# Patient Record
Sex: MALE | Race: WHITE | Employment: OTHER | URBAN - METROPOLITAN AREA
[De-identification: names, ages, dates, MRNs, and addresses within clinical notes are randomized per-mention and may not be internally consistent; named-entity substitution may affect disease eponyms.]

---

## 2022-08-12 NOTE — PROGRESS NOTES
[place]   Date Of Service: 8/15/2022  Provider: Hardik Mitchell DO, DO  Name: Geo Cohen   MRN: [id number]    Chief Complaint(s)      Chief Complaint   Patient presents with    New Patient     New pt multiple arthralgias         History of Present illness (HPI)    Geo Cohen   is a(n)66 y.o. male with a hx of Hypertension  referred by Santana Mahoney MD for evaluation of polyarthralgia , Rheumatoid factor elevation, elevated ESR, CRP elevation    Pain in the hips then progressed to shoulders with associated limited ROm of the shoulders treated with by PCP w/ steroid shot and taper with sig. improvement of symptoms. 1 prior episode in the hips around 15 years ago after hunting. Hand pain (1st CMC and pips) worsened recently but prior cmc joint pain years ago. He has had some relief with ibuprofen 800mg three time daily     Currently with symptoms involving shoulder, hands, neck, hips and knees. Most severe in the morning described as pain/stiffness pain up to 10/10  last Thursday. Aggravating factors: diet, inactivity   Alleviating factors:prednisone and ibuprofen, movement. Current therapy: ibuprofen 800mg three time daily prn. Walking about 2 miles daily. 35 min of AM stiffness. + swelling of mcps bilat and left knee. Difficulty getting up from seated positiong. + gelling.       -denies Photosenstivity, Rash, dry mouth/dry eyes, oral/nasal sores, Raynaud's, digital ulcerations, skin tightening, renal disease,foamy urination, hematuria, sz's, blood clots, AIHA,leukpenia/lymphopenia, thrombocytopenia, hair loss, serositis, arthritis. - denies enthesitis, dactylitis, nail changes, hx of STD,  personal or family history of Psoriatic arthritis, psoriasis, ank spond,       Cancer screening: up to date. Review of Systems    Review of Systems   Constitutional:  Positive for fatigue. Negative for fever and unexpected weight change. HENT: Negative.   Negative for congestion and mouth sores. Eyes: Negative. Negative for pain and redness. Respiratory: Negative. Negative for cough, shortness of breath and wheezing. Cardiovascular: Negative. Negative for chest pain and leg swelling. Gastrointestinal: Negative. Negative for abdominal pain, constipation, nausea and vomiting. Endocrine: Negative for polyuria. Genitourinary: Negative. Negative for difficulty urinating, frequency and hematuria. Skin: Negative. Negative for color change and rash. Neurological:  Positive for headaches (occipital). Negative for dizziness, weakness and numbness. Hematological: Negative. Negative for adenopathy. Does not bruise/bleed easily. Psychiatric/Behavioral: Negative. Negative for agitation and sleep disturbance. The patient is not nervous/anxious. PAST MEDICAL HISTORY     has no past medical history on file. PAST SURGICAL HISTORY     has no past surgical history on file. FAMILY HISTORY    No family history on file. SOCIAL HISTORY           ALLERGIES   Not on File    CURRENT MEDICATIONS    No current outpatient medications on file. PHYSICAL EXAMINATION / OBJECTIVE     Objective:  /82 (Site: Left Upper Arm, Position: Sitting, Cuff Size: Medium Adult)   Pulse 76   Ht 6' 1\" (1.854 m)   Wt 200 lb (90.7 kg)   SpO2 96%   BMI 26.39 kg/m²     Physical Exam  Vitals reviewed. Constitutional:       General: He is not in acute distress. Appearance: He is well-developed. He is not diaphoretic. HENT:      Head: Normocephalic and atraumatic. Right Ear: External ear normal.      Left Ear: External ear normal.      Mouth/Throat:      Mouth: Mucous membranes are moist.      Comments: Red papule along the soft palate  Eyes:      Conjunctiva/sclera: Conjunctivae normal.      Pupils: Pupils are equal, round, and reactive to light. Cardiovascular:      Rate and Rhythm: Normal rate and regular rhythm. Heart sounds: Normal heart sounds. Pulmonary:      Effort: Pulmonary effort is normal.      Breath sounds: Normal breath sounds. Musculoskeletal:         General: Normal range of motion. Cervical back: Neck supple. Lymphadenopathy:      Cervical: No cervical adenopathy. Skin:     General: Skin is warm and dry. Comments: Splinter hemorrhage right 4th finger   Neurological:      Mental Status: He is alert and oriented to person, place, and time. Psychiatric:         Mood and Affect: Affect normal.         Musculoskeletal:    Upper extremities:    SHOULDERS - limited internal rotation bilat. 4/5 strength bilat, tender bilateral. + miguel ángel, speed, infarspinatous ,   ELBOWS tender left lateral epicondyle. ,   WRISTS tender left, painful ROM ,   HANDS/FINGERS     MCPs tender/fullness - left 1-5 PIPs tender / boggy right 2-3. Finkelstein test bilat     Lower extremities:  HIPS tender bilat outer hips. + RITO and FADIR.   KNEES tender bilat. Patellar grind bilat. ANKLES Non-tender    FEET : Non-tender      Spine:   C-spine, T-spine & L-spine:  Non-tender , intact cericals spine ROM       KEY:  Tender :  T  Swelling: S  Non-tender : NT  No swelling: NS         LABS        CBC  No results found for: WBC, RBC, HGB, HCT, MCV, MCH, MCHC, RDW, PLT    CMP  No results found for: GLU, CALCIUM, LABALBU, PROT, NA, K, CO2, CL, BUN, CREATININE, ALKPHOS, ALT, AST    HgBA1c: No components found for: HGBA1C    No results found for: TSHFT4, TSH  No results found for: VITD25      No results found for: ANASCRN  No results found for: SSA  No results found for: SSB  No results found for: ANTI-SMITH  No results found for: DSDNAAB   No results found for: ANTIRNP  No results found for: C3, C4  No results found for: CCPAB  No results found for: RF    No components found for: CANCASCRN, APANCASCRN  No results found for: SEDRATE  No results found for: CRP    JOSEPH: Negative  ESR 25 (0-20 mm/h)  GFR: 76  TSH: 1.85 (0.27-4.2),   CRP 39.4 (0-5 mg/L)  Rheumatoid factor 16 (0-13 IU/mL)    RADIOLOGY:     x-ray hands with DJD bilateral CMC joints. X-ray left hand, and erosive osteoarthritis carpometacarpal joint of the thumb    x-ray right shoulder: Negative examination    X-ray bilateral knees July 14, 2022: Negative    ASSESSMENT/PLAN      1. Rheumatoid factor positive    2. Polyarthralgia    3. Other fatigue    4. Myalgia        Sx's started with some mild arthralgia but significant worsening 4 months ago  initially in the hips and then progressed to the shoulder, hands, knees. + AM stiffness, +gelling. + fatigue. Sig. Relief with corticosteroids, some relief with ib uprofen 800mg three time daily. -- mother & father w/ RA  Up to date on age appropriate cancer screening.    -- ddx include inflammatory myopathy, late onset rheumatoid arthritis, crystalline arthritis, malignancy associated. -- cont. Ibuprofen 800mg three time daily prn.     1. Rheumatoid factor positive    2. Polyarthralgia    3. Other fatigue  -     CBC with Auto Differential; Future  -     Comprehensive Metabolic Panel; Future  -     Sedimentation Rate; Future  -     C-Reactive Protein; Future  -     Uric Acid; Future  -     Phosphorus; Future  -     Magnesium; Future  -     Rheumatoid Factor; Future  -     Cyclic Citrul Peptide Antibody, IgG; Future  -     Miscellaneous Sendout; Future  -     Aldolase; Future  -     CK; Future  -     Electrophoresis Protein, Serum with Reflex to Immunofixation; Future    4. Myalgia - weakness bilat shoulder   -     Aldolase; Future  -     CK; Future  -     Electrophoresis Protein, Serum with Reflex to Immunofixation; Future  5. Localized osteoarthritis of both hands   - cmc squaring bilat.      6. De Quervain's tenosynovitis, bilateral  - + Finkelstein's test and painful resisted abduction of the thumb  - Discussed trial of thumb splinting/wrist splinting initially  - Information provided provided to patient on treatment options and diagnosis  - Other treatment options discussed included hand therapy referral, referral to  orthopedic surgery, and elvia-tendon injections           No follow-ups on file. Electronically signed by Stacie Liriano DO on 8/12/2022 at 8:45 AM    New Prescriptions    No medications on file       8/12/2022       The risks and benefits of my recommendations, as well as other treatment options, benefits and side effects were discussed with the patient today. Questions were answered. Thank you for allowing me to participate in the care of this patient. Please call if there are any questions.

## 2022-08-15 ENCOUNTER — OFFICE VISIT (OUTPATIENT)
Dept: RHEUMATOLOGY | Age: 67
End: 2022-08-15
Payer: COMMERCIAL

## 2022-08-15 VITALS
DIASTOLIC BLOOD PRESSURE: 82 MMHG | BODY MASS INDEX: 26.51 KG/M2 | HEART RATE: 76 BPM | SYSTOLIC BLOOD PRESSURE: 124 MMHG | WEIGHT: 200 LBS | HEIGHT: 73 IN | OXYGEN SATURATION: 96 %

## 2022-08-15 DIAGNOSIS — M79.10 MYALGIA: ICD-10-CM

## 2022-08-15 DIAGNOSIS — M19.042 LOCALIZED OSTEOARTHRITIS OF BOTH HANDS: ICD-10-CM

## 2022-08-15 DIAGNOSIS — R53.83 OTHER FATIGUE: ICD-10-CM

## 2022-08-15 DIAGNOSIS — M25.50 POLYARTHRALGIA: ICD-10-CM

## 2022-08-15 DIAGNOSIS — R76.8 RHEUMATOID FACTOR POSITIVE: Primary | ICD-10-CM

## 2022-08-15 DIAGNOSIS — M19.041 LOCALIZED OSTEOARTHRITIS OF BOTH HANDS: ICD-10-CM

## 2022-08-15 DIAGNOSIS — M65.4 DE QUERVAIN'S TENOSYNOVITIS, BILATERAL: ICD-10-CM

## 2022-08-15 PROBLEM — I10 HYPERTENSION: Status: ACTIVE | Noted: 2022-08-15

## 2022-08-15 LAB
ALBUMIN SERPL-MCNC: 4 G/DL
ALP BLD-CCNC: 73 U/L
ALT SERPL-CCNC: 30 U/L
ANION GAP SERPL CALCULATED.3IONS-SCNC: 9 MMOL/L
AST SERPL-CCNC: 20 U/L
BASOPHILS ABSOLUTE: 0 /ΜL
BASOPHILS RELATIVE PERCENT: 0.5 %
BILIRUB SERPL-MCNC: 0.3 MG/DL (ref 0.1–1.4)
BILIRUBIN, URINE: NEGATIVE
BLOOD, URINE: NEGATIVE
BUN BLDV-MCNC: 17 MG/DL
C-REACTIVE PROTEIN: 10.1
CALCIUM SERPL-MCNC: 9 MG/DL
CHLORIDE BLD-SCNC: 103 MMOL/L
CLARITY: CLEAR
CO2: 30 MMOL/L
COLOR: YELLOW
CREAT SERPL-MCNC: 0.9 MG/DL
EOSINOPHILS ABSOLUTE: 0.1 /ΜL
EOSINOPHILS RELATIVE PERCENT: 1.8 %
GFR CALCULATED: 89
GLUCOSE BLD-MCNC: 106 MG/DL
GLUCOSE URINE: NEGATIVE
HCT VFR BLD CALC: 42.6 % (ref 41–53)
HEMOGLOBIN: 13.9 G/DL (ref 13.5–17.5)
KETONES, URINE: NEGATIVE
LEUKOCYTE ESTERASE, URINE: NEGATIVE
LYMPHOCYTES ABSOLUTE: 1.2 /ΜL
LYMPHOCYTES RELATIVE PERCENT: 30.9 %
MAGNESIUM: 2.3 MG/DL
MCH RBC QN AUTO: 31.2 PG
MCHC RBC AUTO-ENTMCNC: 32.6 G/DL
MCV RBC AUTO: 95.7 FL
MONOCYTES ABSOLUTE: 0.4 /ΜL
MONOCYTES RELATIVE PERCENT: 10.8 %
NEUTROPHILS ABSOLUTE: 2.1 /ΜL
NEUTROPHILS RELATIVE PERCENT: 55.7 %
NITRITE, URINE: NEGATIVE
PDW BLD-RTO: 13.1 %
PH UA: 7 (ref 4.5–8)
PHOSPHORUS: 3.1 MG/DL
PLATELET # BLD: 299 K/ΜL
PMV BLD AUTO: 8.5 FL
POTASSIUM SERPL-SCNC: 4.2 MMOL/L
PROTEIN UA: NEGATIVE
RBC # BLD: 4.45 10^6/ΜL
RHEUMATOID FACTOR: 11
SEDIMENTATION RATE, ERYTHROCYTE: 23
SODIUM BLD-SCNC: 142 MMOL/L
SPECIFIC GRAVITY, URINE: 1.01
TOTAL PROTEIN: 6.8
URIC ACID: 5.4
UROBILINOGEN, URINE: NORMAL
WBC # BLD: 3.8 10^3/ML

## 2022-08-15 PROCEDURE — 99204 OFFICE O/P NEW MOD 45 MIN: CPT | Performed by: INTERNAL MEDICINE

## 2022-08-15 RX ORDER — AZILSARTAN KAMEDOXOMIL AND CHLORTHALIDONE 40; 12.5 MG/1; MG/1
1 TABLET ORAL DAILY
COMMUNITY
Start: 2022-07-22

## 2022-08-15 ASSESSMENT — ENCOUNTER SYMPTOMS
EYE REDNESS: 0
COLOR CHANGE: 0
CONSTIPATION: 0
SHORTNESS OF BREATH: 0
ABDOMINAL PAIN: 0
WHEEZING: 0
VOMITING: 0
RESPIRATORY NEGATIVE: 1
EYES NEGATIVE: 1
NAUSEA: 0
COUGH: 0
GASTROINTESTINAL NEGATIVE: 1
EYE PAIN: 0

## 2022-08-18 ENCOUNTER — TELEPHONE (OUTPATIENT)
Dept: RHEUMATOLOGY | Age: 67
End: 2022-08-18

## 2022-08-18 NOTE — TELEPHONE ENCOUNTER
----- Message from Sarah Verduzco, DO sent at 8/17/2022  5:31 PM EDT -----  Lab testing with a mildly low white blood cell count at 3.8 (cut off before) sed rate mildly elevated at 23 (0-20 mm/h),. Urine studies without significant abnormalities. The liver and kidney function tests were within normal limits. The lab test you noted a C-reactive protein was abnormal and elevated. This lab test indicates possible systemic inflammation. The Lab testing used to help evaluate for rheumatoid arthritis was negative.

## 2022-08-19 ENCOUNTER — TELEPHONE (OUTPATIENT)
Dept: RHEUMATOLOGY | Age: 67
End: 2022-08-19

## 2022-08-19 NOTE — TELEPHONE ENCOUNTER
----- Message from Ana Jay DO sent at 8/19/2022  9:31 AM EDT -----  The other test used to help evaluate for rheumatoid arthritis known as cyclic citrullinated peptide (CCP antibody) was negative.

## 2022-08-19 NOTE — TELEPHONE ENCOUNTER
Called pt to speak about lab results. Pt had questions regarding this that I was not comfortable answering. Informed pt that I would send Dr. Anjana Yun a message to advise.

## 2022-08-23 ENCOUNTER — TELEPHONE (OUTPATIENT)
Dept: RHEUMATOLOGY | Age: 67
End: 2022-08-23

## 2022-08-23 DIAGNOSIS — M05.79 RHEUMATOID ARTHRITIS INVOLVING MULTIPLE SITES WITH POSITIVE RHEUMATOID FACTOR (HCC): ICD-10-CM

## 2022-08-23 DIAGNOSIS — Z51.81 MEDICATION MONITORING ENCOUNTER: ICD-10-CM

## 2022-08-23 DIAGNOSIS — R76.8 RHEUMATOID FACTOR POSITIVE: Primary | ICD-10-CM

## 2022-08-23 RX ORDER — FOLIC ACID 1 MG/1
1 TABLET ORAL DAILY
Qty: 30 TABLET | Refills: 5 | Status: SHIPPED | OUTPATIENT
Start: 2022-08-23 | End: 2022-10-14 | Stop reason: SDUPTHER

## 2022-08-23 NOTE — TELEPHONE ENCOUNTER
Diagnosis Orders   1. Rheumatoid factor positive        2. Rheumatoid arthritis involving multiple sites with positive rheumatoid factor (HCC)  methotrexate (RHEUMATREX) 2.5 MG chemo tablet    folic acid (FOLVITE) 1 MG tablet      3.  Medication monitoring encounter  CBC with Auto Differential    Comprehensive Metabolic Panel    C-Reactive Protein    Sedimentation Rate        - rx for Methotrexate start sent to pharmacy 8/23/22

## 2022-08-23 NOTE — TELEPHONE ENCOUNTER
----- Message from Wilber Jordan DO sent at 8/22/2022  5:10 PM EDT -----  The lab testing revealed a positive JOSEPH: 1: 80 speckled negative chromatin, SSA 52, SSA 60, Vernon, Vernon/RNP,. At this time I would like to discuss potential treatment options including methotrexate 10 mg once weekly with folic acid 1 mg daily. This is to address suspected rheumatoid arthritis. Side effects include but ar not limited to : abd pain, nausea, diarrhea, fatigue, low blood counts, liver injury, increased risk of infection. RARE: nodulosis, increased risk of lymphoma and non melanoma skin cancer, pulmonary scaring/inflammation.

## 2022-08-30 ENCOUNTER — TELEPHONE (OUTPATIENT)
Dept: RHEUMATOLOGY | Age: 67
End: 2022-08-30

## 2022-08-30 NOTE — TELEPHONE ENCOUNTER
Patient called in needing clarification on how he is suppose to take methotrexate. He stated he doesn't have any refills, I advised this is normal when beginning a new medication.

## 2022-09-14 ENCOUNTER — TELEPHONE (OUTPATIENT)
Dept: RHEUMATOLOGY | Age: 67
End: 2022-09-14

## 2022-09-14 NOTE — TELEPHONE ENCOUNTER
[facility] lab called stating that Jemima Fox is currently there to get his labs drawn but they do not have the orders. Patient is wanting to get standing order labs faxed to [facility].     Fax # [phone]

## 2022-09-15 LAB
ALBUMIN SERPL-MCNC: 4 G/DL
ALP BLD-CCNC: 77 U/L
ALT SERPL-CCNC: 36 U/L
ANION GAP SERPL CALCULATED.3IONS-SCNC: 10 MMOL/L
AST SERPL-CCNC: 20 U/L
BASOPHILS ABSOLUTE: 0 /ΜL
BASOPHILS RELATIVE PERCENT: 0.4 %
BILIRUB SERPL-MCNC: 0.3 MG/DL (ref 0.1–1.4)
BUN BLDV-MCNC: 18 MG/DL
C-REACTIVE PROTEIN: 16.8
CALCIUM SERPL-MCNC: 9 MG/DL
CHLORIDE BLD-SCNC: 98 MMOL/L
CO2: 27 MMOL/L
CREAT SERPL-MCNC: 1 MG/DL
EOSINOPHILS ABSOLUTE: 0.1 /ΜL
EOSINOPHILS RELATIVE PERCENT: 2.2 %
GFR CALCULATED: 78
GLUCOSE BLD-MCNC: 114 MG/DL
HCT VFR BLD CALC: 41.2 % (ref 41–53)
HEMOGLOBIN: 13.6 G/DL (ref 13.5–17.5)
LYMPHOCYTES ABSOLUTE: 1.1 /ΜL
LYMPHOCYTES RELATIVE PERCENT: 23.8 %
MCH RBC QN AUTO: 30.9 PG
MCHC RBC AUTO-ENTMCNC: 33 G/DL
MCV RBC AUTO: 93.6 FL
MONOCYTES ABSOLUTE: 0.4 /ΜL
MONOCYTES RELATIVE PERCENT: 8.3 %
NEUTROPHILS ABSOLUTE: 3 /ΜL
NEUTROPHILS RELATIVE PERCENT: 65.1 %
PDW BLD-RTO: 12.7 %
PLATELET # BLD: 274 K/ΜL
PMV BLD AUTO: 8.5 FL
POTASSIUM SERPL-SCNC: 4 MMOL/L
RBC # BLD: 4.4 10^6/ΜL
SEDIMENTATION RATE, ERYTHROCYTE: 21
SODIUM BLD-SCNC: 135 MMOL/L
TOTAL PROTEIN: 6.9
WBC # BLD: 4.6 10^3/ML

## 2022-09-16 ENCOUNTER — TELEPHONE (OUTPATIENT)
Dept: RHEUMATOLOGY | Age: 67
End: 2022-09-16

## 2022-09-16 DIAGNOSIS — M05.79 RHEUMATOID ARTHRITIS INVOLVING MULTIPLE SITES WITH POSITIVE RHEUMATOID FACTOR (HCC): ICD-10-CM

## 2022-09-16 NOTE — TELEPHONE ENCOUNTER
----- Message from Wilber Jordan DO sent at 9/16/2022 10:26 AM EDT -----  The lab results show a mildly low lymphocyte count, and an abnormal CRP (C-reactive protein). There are lab(s) that have not returned. Once these have returned you will be notified.

## 2022-09-20 NOTE — TELEPHONE ENCOUNTER
Phoned pt and informed voiced understanding     DOLV: 08/15/22  DONV: 10/17/22  LAST LAB DRAW: 09/15/22  LAST TB TEST: NA     ALL LABS RETURNED NOW PT NEEDS REFILL     Lab Results   Component Value Date     09/15/2022    K 4.0 09/15/2022    CL 98 09/15/2022    CO2 27 09/15/2022    BUN 18 09/15/2022    CREATININE 1.0 09/15/2022    GLUCOSE 114 09/15/2022    CALCIUM 9.0 09/15/2022    LABALBU 4.0 09/15/2022    BILITOT 0.3 09/15/2022    ALKPHOS 77 09/15/2022    AST 20 09/15/2022    ALT 36 09/15/2022    LABGLOM 78 09/15/2022       Recent Labs     09/15/22  0000   WBC 4.6   HGB 13.6   HCT 41.2   MCV 93.6          Lab Results   Component Value Date    SEDRATE 21 09/15/2022       Lab Results   Component Value Date    CRP 16.8 09/15/2022

## 2022-10-12 LAB
ALBUMIN SERPL-MCNC: 4.2 G/DL
ALP BLD-CCNC: 78 U/L
ALT SERPL-CCNC: 43 U/L
ANION GAP SERPL CALCULATED.3IONS-SCNC: 12 MMOL/L
AST SERPL-CCNC: 23 U/L
BASOPHILS ABSOLUTE: 0 /ΜL
BASOPHILS RELATIVE PERCENT: 0.6 %
BILIRUB SERPL-MCNC: 0.5 MG/DL (ref 0.1–1.4)
BUN BLDV-MCNC: 16 MG/DL
C-REACTIVE PROTEIN: 6.6
CALCIUM SERPL-MCNC: 9.4 MG/DL
CHLORIDE BLD-SCNC: 101 MMOL/L
CO2: 26 MMOL/L
CREAT SERPL-MCNC: 0.9 MG/DL
EOSINOPHILS ABSOLUTE: 0.1 /ΜL
EOSINOPHILS RELATIVE PERCENT: 2.2 %
GFR CALCULATED: 89
GLUCOSE BLD-MCNC: 109 MG/DL
HCT VFR BLD CALC: 41.9 % (ref 41–53)
HEMOGLOBIN: 13.7 G/DL (ref 13.5–17.5)
LYMPHOCYTES ABSOLUTE: 1.1 /ΜL
LYMPHOCYTES RELATIVE PERCENT: 29.8 %
MCH RBC QN AUTO: 31.2 PG
MCHC RBC AUTO-ENTMCNC: 32.7 G/DL
MCV RBC AUTO: 95.4 FL
MONOCYTES ABSOLUTE: 0.3 /ΜL
MONOCYTES RELATIVE PERCENT: 8.6 %
NEUTROPHILS ABSOLUTE: 2.1 /ΜL
NEUTROPHILS RELATIVE PERCENT: 58.5 %
PDW BLD-RTO: 13.6 %
PLATELET # BLD: 241 K/ΜL
PMV BLD AUTO: 8.7 FL
POTASSIUM SERPL-SCNC: 4.4 MMOL/L
RBC # BLD: 4.39 10^6/ΜL
SEDIMENTATION RATE, ERYTHROCYTE: 16
SODIUM BLD-SCNC: 139 MMOL/L
TOTAL PROTEIN: 6.4
WBC # BLD: 3.6 10^3/ML

## 2022-10-14 DIAGNOSIS — M05.79 RHEUMATOID ARTHRITIS INVOLVING MULTIPLE SITES WITH POSITIVE RHEUMATOID FACTOR (HCC): ICD-10-CM

## 2022-10-14 RX ORDER — FOLIC ACID 1 MG/1
2 TABLET ORAL DAILY
Qty: 60 TABLET | Refills: 5 | Status: SHIPPED | OUTPATIENT
Start: 2022-10-14

## 2022-10-14 NOTE — RESULT ENCOUNTER NOTE
Lab testing with mildly elevated function test and low total white blood cell count.   Does have your labs repeated in 4 weeks and increase your folic acid to 2 mg daily

## 2022-10-17 ENCOUNTER — OFFICE VISIT (OUTPATIENT)
Dept: RHEUMATOLOGY | Age: 67
End: 2022-10-17
Payer: COMMERCIAL

## 2022-10-17 VITALS
WEIGHT: 189.8 LBS | SYSTOLIC BLOOD PRESSURE: 112 MMHG | BODY MASS INDEX: 25.15 KG/M2 | OXYGEN SATURATION: 95 % | DIASTOLIC BLOOD PRESSURE: 78 MMHG | HEART RATE: 89 BPM | HEIGHT: 73 IN

## 2022-10-17 DIAGNOSIS — M19.042 LOCALIZED OSTEOARTHRITIS OF BOTH HANDS: ICD-10-CM

## 2022-10-17 DIAGNOSIS — D72.819 LEUKOPENIA, UNSPECIFIED TYPE: ICD-10-CM

## 2022-10-17 DIAGNOSIS — Z51.81 MEDICATION MONITORING ENCOUNTER: ICD-10-CM

## 2022-10-17 DIAGNOSIS — M05.79 RHEUMATOID ARTHRITIS INVOLVING MULTIPLE SITES WITH POSITIVE RHEUMATOID FACTOR (HCC): Primary | ICD-10-CM

## 2022-10-17 DIAGNOSIS — M65.4 DE QUERVAIN'S TENOSYNOVITIS, BILATERAL: ICD-10-CM

## 2022-10-17 DIAGNOSIS — M19.041 LOCALIZED OSTEOARTHRITIS OF BOTH HANDS: ICD-10-CM

## 2022-10-17 DIAGNOSIS — R79.89 LFT ELEVATION: ICD-10-CM

## 2022-10-17 PROCEDURE — 1123F ACP DISCUSS/DSCN MKR DOCD: CPT | Performed by: NURSE PRACTITIONER

## 2022-10-17 PROCEDURE — 96372 THER/PROPH/DIAG INJ SC/IM: CPT | Performed by: NURSE PRACTITIONER

## 2022-10-17 PROCEDURE — 99214 OFFICE O/P EST MOD 30 MIN: CPT | Performed by: NURSE PRACTITIONER

## 2022-10-17 RX ORDER — METHYLPREDNISOLONE ACETATE 80 MG/ML
80 INJECTION, SUSPENSION INTRA-ARTICULAR; INTRALESIONAL; INTRAMUSCULAR; SOFT TISSUE ONCE
Status: COMPLETED | OUTPATIENT
Start: 2022-10-17 | End: 2022-10-17

## 2022-10-17 RX ADMIN — METHYLPREDNISOLONE ACETATE 80 MG: 80 INJECTION, SUSPENSION INTRA-ARTICULAR; INTRALESIONAL; INTRAMUSCULAR; SOFT TISSUE at 09:09

## 2022-10-17 ASSESSMENT — ENCOUNTER SYMPTOMS
CONSTIPATION: 0
BACK PAIN: 0
TROUBLE SWALLOWING: 0
ABDOMINAL PAIN: 0
DIARRHEA: 0
COUGH: 0
EYE PAIN: 0
SHORTNESS OF BREATH: 0
EYE ITCHING: 0
NAUSEA: 0

## 2022-10-17 NOTE — PROGRESS NOTES
[facility] RHEUMATOLOGY FOLLOW UP NOTE       Date Of Service: 10/17/2022  Provider: Jesus Forrester, APRN - CNP    Name: Maribel Giron   MRN: [id number]    Chief Complaint(s)     Chief Complaint   Patient presents with    Follow-up     2 month follow up        History of Present Illness (HPI)     Maribel Giron  is a(n)67 y.o. male with a hx of hypertension here for the f/u evaluation of rheumatoid arthritis. Interval hx:    - increased joint pains over the past month   - started methotrexate about 2 months ago with no sig relief    pain affecting the fingers, shoulders, neck, left knee  Pain on a scale 0-10: 6/10  Type of pain: constant  Timing: mornings  Aggravating factors: diet, inactivity  Alleviating factors: prednisone, ibuprofen, movement    Associated symptoms:  + swelling/  Redness/ warmth (hands), + AM stiffness lasting ~ all day      REVIEW OF SYSTEMS: (ROS)    Review of Systems   Constitutional:  Positive for fatigue. Negative for fever and unexpected weight change. HENT:  Negative for congestion and trouble swallowing. Eyes:  Negative for pain and itching. Respiratory:  Negative for cough and shortness of breath. Cardiovascular:  Negative for chest pain and leg swelling. Gastrointestinal:  Negative for abdominal pain, constipation, diarrhea and nausea. Endocrine: Negative for cold intolerance and heat intolerance. Genitourinary:  Negative for difficulty urinating, frequency and urgency. Musculoskeletal:  Positive for arthralgias, joint swelling and neck pain. Negative for back pain. Skin:  Negative for rash. Neurological:  Negative for dizziness, weakness, numbness and headaches. Psychiatric/Behavioral:  Positive for sleep disturbance. The patient is not nervous/anxious. PAST MEDICAL HISTORY      Past Medical History:   Diagnosis Date    Hypertension        PAST SURGICAL HISTORY    History reviewed. No pertinent surgical history.     FAMILY HISTORY      Family History   Problem Relation Age of Onset    Arthritis Mother     Rheum Arthritis Mother     Arthritis Father     Rheum Arthritis Father        SOCIAL HISTORY      Social History       Tobacco History       Smoking Status  Former Smoking Tobacco Type  Cigarettes      Smokeless Tobacco Use  Never              Alcohol History       Alcohol Use Status  Not Asked              Drug Use       Drug Use Status  Not Asked              Sexual Activity       Sexually Active  Not Asked                    ALLERGIES   No Known Allergies    CURRENT MEDICATIONS      Current Outpatient Medications   Medication Sig Dispense Refill    folic acid (FOLVITE) 1 MG tablet Take 2 tablets by mouth daily 60 tablet 5    methotrexate (RHEUMATREX) 2.5 MG chemo tablet Take 4 tablets by mouth once a week 16 tablet 0    EDARBYCLOR 40-12.5 MG TABS Take 1 tablet by mouth daily Take 1/2 tab       No current facility-administered medications for this visit. PHYSICAL EXAMINATION / OBJECTIVE   Objective:  /78 (Site: Left Upper Arm, Position: Sitting, Cuff Size: Large Adult)   Pulse 89   Ht 6' 0.99\" (1.854 m)   Wt 189 lb 12.8 oz (86.1 kg)   SpO2 95%   BMI 25.05 kg/m²     Physical Exam  Vitals reviewed. Constitutional:       Appearance: He is well-developed. Cardiovascular:      Rate and Rhythm: Normal rate and regular rhythm. Pulmonary:      Effort: Pulmonary effort is normal.      Breath sounds: Normal breath sounds. Musculoskeletal:      Cervical back: Normal range of motion and neck supple. Skin:     General: Skin is warm and dry. Findings: No rash. Neurological:      Mental Status: He is alert and oriented to person, place, and time. Psychiatric:         Thought Content:  Thought content normal.       Upper extremities:    SHOULDERS tender bilat, limited ROM L>R ,   ELBOWS nontender  WRISTS tender left, + bogginess,   HANDS/FINGERS    MCPs tender and bogginess right 2,4, left 2-5    PIPs tender and bogginess bilat 2-4    Lower extremities:  HIPS tender outer hips  KNEES tender bilat  ANKLES nontender   FEET : nontender      LABS    CBC  Lab Results   Component Value Date/Time    WBC 3.6 10/12/2022 12:00 AM    RBC 4.39 10/12/2022 12:00 AM    HGB 13.7 10/12/2022 12:00 AM    HCT 41.9 10/12/2022 12:00 AM    MCV 95.4 10/12/2022 12:00 AM    MCH 31.2 10/12/2022 12:00 AM    MCHC 32.7 10/12/2022 12:00 AM    RDW 13.6 10/12/2022 12:00 AM     10/12/2022 12:00 AM       CMP  Lab Results   Component Value Date/Time    CALCIUM 9.4 10/12/2022 12:00 AM    LABALBU 4.2 10/12/2022 12:00 AM     10/12/2022 12:00 AM    K 4.4 10/12/2022 12:00 AM    CO2 26 10/12/2022 12:00 AM     10/12/2022 12:00 AM    BUN 16 10/12/2022 12:00 AM    CREATININE 0.9 10/12/2022 12:00 AM    ALKPHOS 78 10/12/2022 12:00 AM    ALT 43 10/12/2022 12:00 AM    AST 23 10/12/2022 12:00 AM       HgBA1c: No components found for: HGBA1C    No results found for: VITD25      No results found for: ANASCRN  No results found for: SSA  No results found for: SSB  No results found for: ANTI-SMITH  No results found for: DSDNAAB   No results found for: ANTIRNP  No results found for: C3, C4  No results found for: CCPAB  Lab Results   Component Value Date    RF 11 08/15/2022       No components found for: CANCASCRN, APANCASCRN  Lab Results   Component Value Date    SEDRATE 16 10/12/2022     Lab Results   Component Value Date    CRP 6.6 10/12/2022       RADIOLOGY:         ASSESSMENT/PLAN    Assessment   Plan     Seropositive rheumatoid arthritis  - +RF, neg CCP.     - stop methotrexate 10 mg PO weekly & folic acid 2 mg daily due to LFT elevation, leukopenia, no relief   - discussed arava start if labs normalized in 2 weeks    Leukopenia  LFT elevation    - stopping methotrexate   - repeat labs in 2 weeks    Osteoarthritis bilateral hands- ibuprofen PRN    DeQuervains tenosynovitis bilat   - discussed thumb/wrist splinting    Medication monitoring   - CBC, CMP, sed rate, CRP q 4 weeks x3 if arava started      No follow-ups on file. Electronically signed by CHELSIE Olivera CNP on 10/17/2022 at 8:32 AM    New Prescriptions    No medications on file       Thank you for allowing me to participate in the care of this patient. Please call if there are any questions.

## 2022-10-18 ENCOUNTER — TELEPHONE (OUTPATIENT)
Dept: RHEUMATOLOGY | Age: 67
End: 2022-10-18

## 2022-10-18 NOTE — TELEPHONE ENCOUNTER
----- Message from Antony Gutierrez DO sent at 10/14/2022  1:47 PM EDT -----  Lab testing with mildly elevated function test and low total white blood cell count.   Does have your labs repeated in 4 weeks and increase your folic acid to 2 mg daily

## 2022-10-25 LAB
ALBUMIN SERPL-MCNC: 4.4 G/DL
ALP BLD-CCNC: 79 U/L
ALT SERPL-CCNC: 28 U/L
ANION GAP SERPL CALCULATED.3IONS-SCNC: 11 MMOL/L
AST SERPL-CCNC: 17 U/L
BASOPHILS ABSOLUTE: 0 /ΜL
BASOPHILS RELATIVE PERCENT: 0.5 %
BILIRUB SERPL-MCNC: 0.4 MG/DL (ref 0.1–1.4)
BUN BLDV-MCNC: 19 MG/DL
C-REACTIVE PROTEIN: 0.6
CALCIUM SERPL-MCNC: 9.3 MG/DL
CHLORIDE BLD-SCNC: 100 MMOL/L
CO2: 27 MMOL/L
CREAT SERPL-MCNC: 1 MG/DL
EOSINOPHILS ABSOLUTE: 0.1 /ΜL
EOSINOPHILS RELATIVE PERCENT: 2.7 %
GFR CALCULATED: 78
GLUCOSE BLD-MCNC: 98 MG/DL
HCT VFR BLD CALC: 42.7 % (ref 41–53)
HEMOGLOBIN: 14 G/DL (ref 13.5–17.5)
LYMPHOCYTES ABSOLUTE: 1.6 /ΜL
LYMPHOCYTES RELATIVE PERCENT: 44 %
MCH RBC QN AUTO: 31.7 PG
MCHC RBC AUTO-ENTMCNC: 32.8 G/DL
MCV RBC AUTO: 96.6 FL
MONOCYTES ABSOLUTE: 0.3 /ΜL
MONOCYTES RELATIVE PERCENT: 8.6 %
NEUTROPHILS ABSOLUTE: 1.6 /ΜL
NEUTROPHILS RELATIVE PERCENT: 43.9 %
PDW BLD-RTO: 13.9 %
PLATELET # BLD: 257 K/ΜL
PMV BLD AUTO: 9 FL
POTASSIUM SERPL-SCNC: 4.5 MMOL/L
RBC # BLD: 4.42 10^6/ΜL
SEDIMENTATION RATE, ERYTHROCYTE: 12
SODIUM BLD-SCNC: 138 MMOL/L
TOTAL PROTEIN: 6.7
WBC # BLD: 3.7 10^3/ML

## 2022-10-27 ENCOUNTER — TELEPHONE (OUTPATIENT)
Dept: RHEUMATOLOGY | Age: 67
End: 2022-10-27

## 2022-10-27 NOTE — TELEPHONE ENCOUNTER
Spoke to Jayson Gaines with results. Does not want to start arava at this time. Has started a tumeric supplement and feels much better. Does not want to start any medication at this time.

## 2022-10-27 NOTE — TELEPHONE ENCOUNTER
----- Message from CHELSIE Valenzuela - CNP sent at 10/27/2022 12:27 PM EDT -----  Blood testing with mildly low white blood cell count which is stable from previous evaluation. No other significant abnormalities. Would you be willing to start the arava we discussed at your appointment? Side effects include GI intolerance, diarrhea, alopecia, infection; weight loss, low blood counts, liver injury, ILD, oral sores, rash; RARE: neuropathy.      Would still need labs q 4 weeks x3

## 2022-12-22 ENCOUNTER — OFFICE VISIT (OUTPATIENT)
Dept: RHEUMATOLOGY | Age: 67
End: 2022-12-22
Payer: COMMERCIAL

## 2022-12-22 VITALS
HEART RATE: 80 BPM | SYSTOLIC BLOOD PRESSURE: 112 MMHG | HEIGHT: 73 IN | WEIGHT: 190 LBS | BODY MASS INDEX: 25.18 KG/M2 | DIASTOLIC BLOOD PRESSURE: 76 MMHG | OXYGEN SATURATION: 96 %

## 2022-12-22 DIAGNOSIS — M65.4 DE QUERVAIN'S TENOSYNOVITIS, BILATERAL: ICD-10-CM

## 2022-12-22 DIAGNOSIS — M05.79 RHEUMATOID ARTHRITIS INVOLVING MULTIPLE SITES WITH POSITIVE RHEUMATOID FACTOR (HCC): Primary | ICD-10-CM

## 2022-12-22 DIAGNOSIS — M19.042 LOCALIZED OSTEOARTHRITIS OF BOTH HANDS: ICD-10-CM

## 2022-12-22 DIAGNOSIS — M19.041 LOCALIZED OSTEOARTHRITIS OF BOTH HANDS: ICD-10-CM

## 2022-12-22 DIAGNOSIS — Z51.81 MEDICATION MONITORING ENCOUNTER: ICD-10-CM

## 2022-12-22 PROCEDURE — 3078F DIAST BP <80 MM HG: CPT | Performed by: NURSE PRACTITIONER

## 2022-12-22 PROCEDURE — 3074F SYST BP LT 130 MM HG: CPT | Performed by: NURSE PRACTITIONER

## 2022-12-22 PROCEDURE — 99214 OFFICE O/P EST MOD 30 MIN: CPT | Performed by: NURSE PRACTITIONER

## 2022-12-22 PROCEDURE — 1123F ACP DISCUSS/DSCN MKR DOCD: CPT | Performed by: NURSE PRACTITIONER

## 2022-12-22 RX ORDER — HYDROXYCHLOROQUINE SULFATE 200 MG/1
200 TABLET, FILM COATED ORAL 2 TIMES DAILY
Qty: 60 TABLET | Refills: 2 | Status: SHIPPED | OUTPATIENT
Start: 2022-12-22

## 2022-12-22 RX ORDER — VIT C/B6/B5/MAGNESIUM/HERB 173 50-5-6-5MG
750 CAPSULE ORAL DAILY
COMMUNITY

## 2022-12-22 ASSESSMENT — ENCOUNTER SYMPTOMS
CONSTIPATION: 0
SHORTNESS OF BREATH: 0
EYE ITCHING: 0
DIARRHEA: 0
TROUBLE SWALLOWING: 0
ABDOMINAL PAIN: 0
EYE PAIN: 0
BACK PAIN: 0
NAUSEA: 0
COUGH: 0

## 2022-12-22 NOTE — PROGRESS NOTES
COVID-19 RAMOS SCREENING    Patient Information   Patient Name: Colby Sosa  Phone: [phone]  Provider: Tim Kenney MD  Date: 4/27/2020  Site: [facility]  Dept: [facility]  Level of Care: Inpatient     SCREENING QUESTIONS    1. Have you had a fever (above 100.4F) any time over the past four days?  no    2. Have you had a cough or cold-like symptoms, including a runny or stuffy nose, a sore throat, or breathing problems in the past four days?  no    3. Do you have reason to believe that you have been exposed to a person with COVID-19?  no    4. Have you traveled outside of the country and/or to any geographic areas that are at high risk of infection?  no    5. Temperature: 97.4      Enter Screening Results Below  Passed = Patient continues to treatment  Failed  = Enter \".[id number]\"    RESULTS   Passed       Staff: Irina Shaikh RN           PAST SURGICAL HISTORY    History reviewed. No pertinent surgical history. FAMILY HISTORY      Family History   Problem Relation Age of Onset    Arthritis Mother     Rheum Arthritis Mother     Arthritis Father     Rheum Arthritis Father        SOCIAL HISTORY      Social History       Tobacco History       Smoking Status  Former Smoking Tobacco Type  Cigarettes      Smokeless Tobacco Use  Never              Alcohol History       Alcohol Use Status  Not Asked              Drug Use       Drug Use Status  Not Asked              Sexual Activity       Sexually Active  Not Asked                    ALLERGIES   No Known Allergies    CURRENT MEDICATIONS      Current Outpatient Medications   Medication Sig Dispense Refill    turmeric 500 MG CAPS Take 750 mg by mouth daily      EDARBYCLOR 40-12.5 MG TABS Take 1 tablet by mouth daily Take 1/2 tab      methotrexate (RHEUMATREX) 2.5 MG chemo tablet Take 4 tablets by mouth once a week (Patient not taking: Reported on 12/22/2022) 16 tablet 0     No current facility-administered medications for this visit. PHYSICAL EXAMINATION / OBJECTIVE   Objective:  /76 (Site: Left Upper Arm, Position: Sitting, Cuff Size: Medium Adult)   Pulse 80   Ht 6' 0.99\" (1.854 m)   Wt 190 lb (86.2 kg)   SpO2 96%   BMI 25.07 kg/m²     Physical Exam  Vitals reviewed. Constitutional:       Appearance: He is well-developed. Cardiovascular:      Rate and Rhythm: Normal rate and regular rhythm. Pulmonary:      Effort: Pulmonary effort is normal.      Breath sounds: Normal breath sounds. Musculoskeletal:      Cervical back: Normal range of motion and neck supple. Skin:     General: Skin is warm and dry. Findings: No rash. Neurological:      Mental Status: He is alert and oriented to person, place, and time. Psychiatric:         Thought Content:  Thought content normal.       Upper extremities:    SHOULDERS tender bilat, limited ROM L>R ,   ELBOWS nontender  WRISTS tender left  HANDS/FINGERS    MCPs nontender, + bogginess right 2,3, left 2-4    PIPs nontender, + bogginess bilat    Lower extremities:  HIPS tender outer hips  KNEES tender bilat  ANKLES nontender   FEET : nontender      LABS    CBC  Lab Results   Component Value Date/Time    WBC 3.7 10/25/2022 12:00 AM    RBC 4.42 10/25/2022 12:00 AM    HGB 14.0 10/25/2022 12:00 AM    HCT 42.7 10/25/2022 12:00 AM    MCV 96.6 10/25/2022 12:00 AM    MCH 31.7 10/25/2022 12:00 AM    MCHC 32.8 10/25/2022 12:00 AM    RDW 13.9 10/25/2022 12:00 AM     10/25/2022 12:00 AM       CMP  Lab Results   Component Value Date/Time    CALCIUM 9.3 10/25/2022 12:00 AM    LABALBU 4.4 10/25/2022 12:00 AM     10/25/2022 12:00 AM    K 4.5 10/25/2022 12:00 AM    CO2 27 10/25/2022 12:00 AM     10/25/2022 12:00 AM    BUN 19 10/25/2022 12:00 AM    CREATININE 1.0 10/25/2022 12:00 AM    ALKPHOS 79 10/25/2022 12:00 AM    ALT 28 10/25/2022 12:00 AM    AST 17 10/25/2022 12:00 AM       HgBA1c: No components found for: HGBA1C    No results found for: VITD25      No results found for: ANASCRN  No results found for: SSA  No results found for: SSB  No results found for: ANTI-SMITH  No results found for: DSDNAAB   No results found for: ANTIRNP  No results found for: C3, C4  No results found for: CCPAB  Lab Results   Component Value Date    RF 11 08/15/2022       No components found for: CANCASCRN, APANCASCRN  Lab Results   Component Value Date    SEDRATE 12 10/25/2022     Lab Results   Component Value Date    CRP 0.6 10/25/2022       RADIOLOGY:         ASSESSMENT/PLAN    Assessment   Plan     Seropositive rheumatoid arthritis  - +RF, neg CCP. - prior tx: methotrexate 10 mg (leukopenia, LFT elevation)   - declines arava start, SSZ start   - start plaquenil 200 mg BID. Side effects: rash, diarrhea, retinopathy.  SAFE in pregnancy    Leukopenia- resolved  LFT elevation- resolved    Osteoarthritis bilateral hands- ibuprofen PRN    DeQuervains tenosynovitis bilat   - discussed thumb/wrist splinting    Medication monitoring   - CBC, CMP, sed rate, CRP q 6 months   - need baseline and yearly eye exam      No follow-ups on file. Electronically signed by CHELSIE Newell CNP on 12/22/2022 at 10:22 AM    New Prescriptions    No medications on file       Thank you for allowing me to participate in the care of this patient. Please call if there are any questions.

## 2023-02-20 ENCOUNTER — OFFICE VISIT (OUTPATIENT)
Dept: RHEUMATOLOGY | Age: 68
End: 2023-02-20
Payer: COMMERCIAL

## 2023-02-20 VITALS
DIASTOLIC BLOOD PRESSURE: 74 MMHG | OXYGEN SATURATION: 98 % | BODY MASS INDEX: 25.58 KG/M2 | SYSTOLIC BLOOD PRESSURE: 118 MMHG | HEART RATE: 72 BPM | HEIGHT: 73 IN | WEIGHT: 193 LBS

## 2023-02-20 DIAGNOSIS — M65.4 DE QUERVAIN'S TENOSYNOVITIS, BILATERAL: ICD-10-CM

## 2023-02-20 DIAGNOSIS — M19.042 LOCALIZED OSTEOARTHRITIS OF BOTH HANDS: ICD-10-CM

## 2023-02-20 DIAGNOSIS — Z51.81 MEDICATION MONITORING ENCOUNTER: ICD-10-CM

## 2023-02-20 DIAGNOSIS — M19.041 LOCALIZED OSTEOARTHRITIS OF BOTH HANDS: ICD-10-CM

## 2023-02-20 DIAGNOSIS — M05.79 RHEUMATOID ARTHRITIS INVOLVING MULTIPLE SITES WITH POSITIVE RHEUMATOID FACTOR (HCC): Primary | ICD-10-CM

## 2023-02-20 PROCEDURE — 1123F ACP DISCUSS/DSCN MKR DOCD: CPT | Performed by: INTERNAL MEDICINE

## 2023-02-20 PROCEDURE — 99214 OFFICE O/P EST MOD 30 MIN: CPT | Performed by: INTERNAL MEDICINE

## 2023-02-20 PROCEDURE — 3078F DIAST BP <80 MM HG: CPT | Performed by: INTERNAL MEDICINE

## 2023-02-20 PROCEDURE — 3074F SYST BP LT 130 MM HG: CPT | Performed by: INTERNAL MEDICINE

## 2023-02-20 ASSESSMENT — ENCOUNTER SYMPTOMS
RESPIRATORY NEGATIVE: 1
COUGH: 0
DIARRHEA: 0
EYE PAIN: 0
EYES NEGATIVE: 1
NAUSEA: 0
SHORTNESS OF BREATH: 0
CONSTIPATION: 0
ABDOMINAL PAIN: 0
TROUBLE SWALLOWING: 0
BACK PAIN: 0
GASTROINTESTINAL NEGATIVE: 1
EYE ITCHING: 0

## 2023-02-20 ASSESSMENT — JOINT PAIN
TOTAL NUMBER OF TENDER JOINTS: 0
TOTAL NUMBER OF SWOLLEN JOINTS: 0

## 2023-02-20 NOTE — PROGRESS NOTES
[facility] RHEUMATOLOGY FOLLOW UP NOTE       Date Of Service: 2/20/2023  Provider: Fedeirca Reynolds DO PGY-3    Name: Tia Hanley   MRN: [id number]    Chief Complaint(s)     Chief Complaint   Patient presents with    Follow-up     2 mo f/u, Rheumatoid factor positive    Pt stated pain 2-2.5/10 - Bilateral knees, bilateral hands (mostly in mornings)         History of Present Illness (HPI)     Tia Hanley  is a(n)67 y.o. male with a hx of hypertension here for the f/u evaluation of rheumatoid arthritis. Interval hx:    - taking curcumin with significant pain relief. Declined arava in the past  - Has been compliant with plaquenil    Pain worse in left knee otherwise reports improvement in morning stiffness. Shoulder pain improved. Can raise arms up  by head now  Mild pain affecting the fingers, knees, shoulders  Pain on a scale 0-10: 2/10  Type of pain: constant  Timing: mornings  Aggravating factors: diet, inactivity  Alleviating factors: prednisone, ibuprofen, movement    Associated symptoms:  + AM stiffness lasting ~ 30 minutes      REVIEW OF SYSTEMS: (ROS)    Review of Systems   Constitutional:  Positive for fatigue. Negative for fever and unexpected weight change. HENT:  Negative for congestion and trouble swallowing. Eyes:  Negative for pain and itching. Respiratory:  Negative for cough and shortness of breath. Cardiovascular:  Negative for chest pain and leg swelling. Gastrointestinal:  Negative for abdominal pain, constipation, diarrhea and nausea. Endocrine: Negative for cold intolerance and heat intolerance. Genitourinary:  Negative for difficulty urinating, frequency and urgency. Musculoskeletal:  Positive for arthralgias and joint swelling. Negative for back pain and neck pain. Skin:  Negative for rash. Neurological:  Negative for dizziness, weakness, numbness and headaches. Psychiatric/Behavioral:  Positive for sleep disturbance. The patient is not nervous/anxious. PAST MEDICAL HISTORY      Past Medical History:   Diagnosis Date    Hypertension        PAST SURGICAL HISTORY    History reviewed. No pertinent surgical history. FAMILY HISTORY      Family History   Problem Relation Age of Onset    Arthritis Mother     Rheum Arthritis Mother     Arthritis Father     Rheum Arthritis Father        SOCIAL HISTORY      Social History       Tobacco History       Smoking Status  Former Smoking Tobacco Type  Cigarettes      Smokeless Tobacco Use  Never              Alcohol History       Alcohol Use Status  Not Asked              Drug Use       Drug Use Status  Not Asked              Sexual Activity       Sexually Active  Not Asked                    ALLERGIES   No Known Allergies    CURRENT MEDICATIONS      Current Outpatient Medications   Medication Sig Dispense Refill    turmeric 500 MG CAPS Take 750 mg by mouth daily      hydroxychloroquine (PLAQUENIL) 200 MG tablet Take 1 tablet by mouth 2 times daily 60 tablet 2    EDARBYCLOR 40-12.5 MG TABS Take 1 tablet by mouth daily Take 1/2 tab      methotrexate (RHEUMATREX) 2.5 MG chemo tablet Take 4 tablets by mouth once a week (Patient not taking: No sig reported) 16 tablet 0     No current facility-administered medications for this visit. PHYSICAL EXAMINATION / OBJECTIVE   Objective:  /74 (Site: Left Upper Arm, Position: Sitting, Cuff Size: Large Adult)   Pulse 72   Ht 6' 0.99\" (1.854 m)   Wt 193 lb (87.5 kg)   SpO2 98%   BMI 25.47 kg/m²     Physical Exam  Vitals reviewed. Constitutional:       Appearance: He is well-developed. Cardiovascular:      Rate and Rhythm: Normal rate and regular rhythm. Pulmonary:      Effort: Pulmonary effort is normal.      Breath sounds: Normal breath sounds. Musculoskeletal:      Cervical back: Normal range of motion and neck supple. Skin:     General: Skin is warm and dry. Findings: No rash.    Neurological:      Mental Status: He is alert and oriented to person, place, and time.   Psychiatric:         Thought Content: Thought content normal.       Upper extremities:    SHOULDERS tender bilat, limited ROM L>R ,   ELBOWS nontender  WRISTS tender left  HANDS/FINGERS    MCPs nontender    PIPs nontender, + bogginess bilat PIP right 3    Lower extremities:  HIPS tender outer hips  KNEES tender bilat  ANKLES nontender   FEET : nontender      LABS    CBC  Lab Results   Component Value Date/Time    WBC 3.7 10/25/2022 12:00 AM    RBC 4.42 10/25/2022 12:00 AM    HGB 14.0 10/25/2022 12:00 AM    HCT 42.7 10/25/2022 12:00 AM    MCV 96.6 10/25/2022 12:00 AM    MCH 31.7 10/25/2022 12:00 AM    MCHC 32.8 10/25/2022 12:00 AM    RDW 13.9 10/25/2022 12:00 AM     10/25/2022 12:00 AM       CMP  Lab Results   Component Value Date/Time    CALCIUM 9.3 10/25/2022 12:00 AM    LABALBU 4.4 10/25/2022 12:00 AM     10/25/2022 12:00 AM    K 4.5 10/25/2022 12:00 AM    CO2 27 10/25/2022 12:00 AM     10/25/2022 12:00 AM    BUN 19 10/25/2022 12:00 AM    CREATININE 1.0 10/25/2022 12:00 AM    ALKPHOS 79 10/25/2022 12:00 AM    ALT 28 10/25/2022 12:00 AM    AST 17 10/25/2022 12:00 AM       HgBA1c: No components found for: HGBA1C    No results found for: VITD25      No results found for: ANASCRN  No results found for: SSA  No results found for: SSB  No results found for: ANTI-SMITH  No results found for: DSDNAAB   No results found for: ANTIRNP  No results found for: C3, C4  No results found for: CCPAB  Lab Results   Component Value Date    RF 11 08/15/2022       No components found for: CANCASCRN, APANCASCRN  Lab Results   Component Value Date    SEDRATE 12 10/25/2022     Lab Results   Component Value Date    CRP 0.6 10/25/2022       RADIOLOGY:         ASSESSMENT/PLAN    Assessment   Plan     Seropositive rheumatoid arthritis  - +RF, neg CCP. - prior tx: methotrexate 10 mg (leukopenia, LFT elevation)   - declines arava start, SSZ start  - continue plaquenil 200 mg BID.  Side effects: rash, diarrhea, retinopathy. SAFE in pregnancy  - taking OTC curcumin     Osteoarthritis bilateral hands- ibuprofen PRN    DeQuervains tenosynovitis bilat   - discussed thumb/wrist splinting    Medication monitoring   - CBC, CMP, sed rate, CRP q 6 months   - need baseline and yearly eye exam      Return in about 6 months (around 8/20/2023). Electronically signed by Ashleigh Dhaliwal DO on 2/20/2023 at 9:36 AM    New Prescriptions    No medications on file       Thank you for allowing me to participate in the care of this patient. Please call if there are any questions.

## 2023-02-20 NOTE — PROGRESS NOTES
[facility] RHEUMATOLOGY FOLLOW UP NOTE       Date Of Service: 2/20/2023  Provider: Lauren Meyers DO ,   PCP: Robina Shabazz MD   Name: Irma Castaneda   MRN: [id number]        History of Present Illness (HPI)     Chief Complaint   Patient presents with    Follow-up     2 mo f/u, Rheumatoid factor positive    Pt stated pain 2-2.5/10 - Bilateral knees, bilateral hands (mostly in mornings)         Irma Castaneda  is a(n)67 y.o. male with a hx of  has a past medical history of Hypertension. here for the f/u evaluation of Rheumatoid arthritis  , osteoarthritis  and de Quervain's tenosynovitis    Interval hx:   - tolerating Plaquenil and curcuramin     Mild 2/10 pain - bilateral hands , left knee  Aggravating : knee - wt bearing. , inactivity   Alleviating: curcuramin   Cont. Swelling of fingers. Improved ROM of the shoulder since the last evaluation. , difficulty making a fiest.   All day stiffness. + gelling       REVIEW OF SYSTEMS: (ROS)    Review of Systems   Constitutional: Negative. HENT: Negative. Eyes: Negative. Respiratory: Negative. Cardiovascular: Negative. Gastrointestinal: Negative. Endocrine: Negative. Genitourinary: Negative. Skin: Negative. Neurological: Negative. Hematological: Negative. PmHx:  has a past medical history of Hypertension. Social History:  reports that he has quit smoking. His smoking use included cigarettes.  He has never used smokeless tobacco.    No Known Allergies    CURRENT MEDICATIONS      Current Outpatient Medications:     turmeric 500 MG CAPS, Take 750 mg by mouth daily, Disp: , Rfl:     hydroxychloroquine (PLAQUENIL) 200 MG tablet, Take 1 tablet by mouth 2 times daily, Disp: 60 tablet, Rfl: 2    EDARBYCLOR 40-12.5 MG TABS, Take 1 tablet by mouth daily Take 1/2 tab, Disp: , Rfl:     methotrexate (RHEUMATREX) 2.5 MG chemo tablet, Take 4 tablets by mouth once a week (Patient not taking: No sig reported), Disp: 16 tablet, Rfl: 0      PHYSICAL EXAMINATION / OBJECTIVE     Objective:  /74 (Site: Left Upper Arm, Position: Sitting, Cuff Size: Large Adult)   Pulse 72   Ht 6' 0.99\" (1.854 m)   Wt 193 lb (87.5 kg)   SpO2 98%   BMI 25.47 kg/m²     Physical Exam  Vitals reviewed. Constitutional:       Appearance: Normal appearance. HENT:      Head: Normocephalic. Nose: Nose normal.   Eyes:      Pupils: Pupils are equal, round, and reactive to light. Cardiovascular:      Rate and Rhythm: Normal rate. Heart sounds: No murmur heard. Pulmonary:      Effort: Pulmonary effort is normal.      Breath sounds: Normal breath sounds. Skin:     General: Skin is warm and dry. Neurological:      Mental Status: He is alert. Musculoskeletal:  Upper extremities:    SHOULDERS Non-tender   ELBOWS nontender  WRISTS -- Non-tender bilateral . + limited ROm Right wrist.   HANDS/FINGERS - mild tender flexor tendons. Right 3-4 fingers. Inability to make a composite fist right > left   Hips   KNEES tender left , w/ patellar grind testing. ANKLES nontender   FEET : nontender     Physical Exam    There is currently no information documented on the homunculus.  Go to the Rheumatology activity and complete the homunculus joint exam.    RICHARDS-28 (ESR): --  RICHARDS-28 (CRP): --            LABS      Lab Results   Component Value Date    WBC 3.7 10/25/2022    HGB 14.0 10/25/2022    HGB 13.7 10/12/2022    HGB 13.6 09/15/2022    MCV 96.6 10/25/2022    MCHC 32.8 10/25/2022    RDW 13.9 10/25/2022     10/25/2022     10/12/2022     09/15/2022    NEUTROABS 1.6 10/25/2022    LYMPHSABS 1.6 10/25/2022    LYMPHSABS 1.1 10/12/2022    LYMPHSABS 1.1 09/15/2022    EOSABS 0.1 10/25/2022    BASOSABS 0.0 10/25/2022         Chemistry        Component Value Date/Time     10/25/2022 0000    K 4.5 10/25/2022 0000     10/25/2022 0000    CO2 27 10/25/2022 0000    BUN 19 10/25/2022 0000    CREATININE 1.0 10/25/2022 0000        Component Value Date/Time    CALCIUM 9.3 10/25/2022 0000    ALKPHOS 79 10/25/2022 0000    AST 17 10/25/2022 0000    ALT 28 10/25/2022 0000    BILITOT 0.4 10/25/2022 0000            Lab Results   Component Value Date    SEDRATE 12 10/25/2022    SEDRATE 16 10/12/2022    SEDRATE 21 09/15/2022    CRP 0.6 10/25/2022    CRP 6.6 10/12/2022    CRP 16.8 09/15/2022       No results found for: ANASCRN, SSA, SSB, ANTI-SMITH, DSDNAAB, ANTIRNP    No results found for: C3, C4    Lab Results   Component Value Date    RF 11 08/15/2022         RADIOLOGY / PROCEDURES:       ASSESSMENT/PLAN:     1. Rheumatoid arthritis involving multiple sites with positive rheumatoid factor ([facility] [address]    2. Localized osteoarthritis of both hands    3. De Quervain's tenosynovitis, bilateral    4. Medication monitoring encounter      Seropositive rheumatoid arthritis - improved w/no synovitis - mild dz   - +RF, neg CCP. - prior tx: methotrexate 10 mg (leukopenia, LFT elevation)               - declines arava start, SSZ start               - plaquenil 200 mg TWICE DAILY-- need eye exam      Osteoarthritis bilateral hands- ibuprofen PRN   - mainly taking curcumin       Left knee pain - suspected patellofemoral OSTEOARTHRITIS  -- asked patient to try knee bracing. DeQuervains tenosynovitis bilat - resolved      Medication monitoring               - CBC, CMP, sed rate, CRP q 6 months               - need baseline and yearly eye exam   - repeat labs at the next evaluation     No follow-ups on file. New Prescriptions    No medications on file       2/20/2023    Please contact the office if you have any questions or change of symptoms.

## 2023-03-24 DIAGNOSIS — M05.79 RHEUMATOID ARTHRITIS INVOLVING MULTIPLE SITES WITH POSITIVE RHEUMATOID FACTOR (HCC): ICD-10-CM

## 2023-03-24 RX ORDER — HYDROXYCHLOROQUINE SULFATE 200 MG/1
200 TABLET, FILM COATED ORAL 2 TIMES DAILY
Qty: 60 TABLET | Refills: 2 | Status: SHIPPED | OUTPATIENT
Start: 2023-03-24

## 2023-03-24 NOTE — TELEPHONE ENCOUNTER
Mayra Daily called requesting a refill on the following medications:  Requested Prescriptions     Pending Prescriptions Disp Refills    hydroxychloroquine (PLAQUENIL) 200 MG tablet 60 tablet 2     Sig: Take 1 tablet by mouth 2 times daily     Pharmacy verified: [facility]   .       Date of last visit: 2/20/2023  Date of next visit (if applicable): 4/59/9544

## 2023-07-06 DIAGNOSIS — M05.79 RHEUMATOID ARTHRITIS INVOLVING MULTIPLE SITES WITH POSITIVE RHEUMATOID FACTOR (HCC): ICD-10-CM

## 2023-07-07 RX ORDER — HYDROXYCHLOROQUINE SULFATE 200 MG/1
200 TABLET, FILM COATED ORAL 2 TIMES DAILY
Qty: 60 TABLET | Refills: 2 | Status: SHIPPED | OUTPATIENT
Start: 2023-07-07

## 2023-08-21 ENCOUNTER — OFFICE VISIT (OUTPATIENT)
Dept: RHEUMATOLOGY | Age: 68
End: 2023-08-21
Payer: COMMERCIAL

## 2023-08-21 VITALS
HEIGHT: 73 IN | OXYGEN SATURATION: 95 % | DIASTOLIC BLOOD PRESSURE: 70 MMHG | SYSTOLIC BLOOD PRESSURE: 104 MMHG | BODY MASS INDEX: 25.02 KG/M2 | WEIGHT: 188.8 LBS | HEART RATE: 74 BPM

## 2023-08-21 DIAGNOSIS — M19.041 LOCALIZED OSTEOARTHRITIS OF BOTH HANDS: ICD-10-CM

## 2023-08-21 DIAGNOSIS — M05.79 RHEUMATOID ARTHRITIS INVOLVING MULTIPLE SITES WITH POSITIVE RHEUMATOID FACTOR (HCC): Primary | ICD-10-CM

## 2023-08-21 DIAGNOSIS — Z51.81 MEDICATION MONITORING ENCOUNTER: ICD-10-CM

## 2023-08-21 DIAGNOSIS — M65.4 DE QUERVAIN'S TENOSYNOVITIS, BILATERAL: ICD-10-CM

## 2023-08-21 DIAGNOSIS — M19.042 LOCALIZED OSTEOARTHRITIS OF BOTH HANDS: ICD-10-CM

## 2023-08-21 LAB
ALBUMIN SERPL-MCNC: 4.6 G/DL
ALP BLD-CCNC: 60 U/L
ALT SERPL-CCNC: 19 U/L
ANION GAP SERPL CALCULATED.3IONS-SCNC: 13 MMOL/L
AST SERPL-CCNC: 20 U/L
BASOPHILS ABSOLUTE: 0 /ΜL
BASOPHILS RELATIVE PERCENT: 0.9 %
BILIRUB SERPL-MCNC: 0.4 MG/DL (ref 0.1–1.4)
BUN BLDV-MCNC: 19 MG/DL
C-REACTIVE PROTEIN: 0.7
CALCIUM SERPL-MCNC: 9.3 MG/DL
CHLORIDE BLD-SCNC: 99 MMOL/L
CO2: 26 MMOL/L
CREAT SERPL-MCNC: 1.1 MG/DL
EGFR: 69
EOSINOPHILS ABSOLUTE: 0.2 /ΜL
EOSINOPHILS RELATIVE PERCENT: 4.3 %
GLUCOSE BLD-MCNC: 93 MG/DL
HCT VFR BLD CALC: 41.4 % (ref 41–53)
HEMOGLOBIN: 13.6 G/DL (ref 13.5–17.5)
LYMPHOCYTES ABSOLUTE: 1.2 /ΜL
LYMPHOCYTES RELATIVE PERCENT: 34.3 %
MCH RBC QN AUTO: 32.3 PG
MCHC RBC AUTO-ENTMCNC: 32.9 G/DL
MCV RBC AUTO: 98.3 FL
MONOCYTES ABSOLUTE: 0.4 /ΜL
MONOCYTES RELATIVE PERCENT: 11.2 %
NEUTROPHILS ABSOLUTE: 1.7 /ΜL
NEUTROPHILS RELATIVE PERCENT: 49 %
PDW BLD-RTO: 12.4 %
PLATELET # BLD: 197 K/ΜL
PMV BLD AUTO: 8.9 FL
POTASSIUM SERPL-SCNC: 4.3 MMOL/L
RBC # BLD: 4.21 10^6/ΜL
SODIUM BLD-SCNC: 138 MMOL/L
TOTAL PROTEIN: 6.7
WBC # BLD: 3.5 10^3/ML

## 2023-08-21 PROCEDURE — 3078F DIAST BP <80 MM HG: CPT | Performed by: INTERNAL MEDICINE

## 2023-08-21 PROCEDURE — 99214 OFFICE O/P EST MOD 30 MIN: CPT | Performed by: INTERNAL MEDICINE

## 2023-08-21 PROCEDURE — 3074F SYST BP LT 130 MM HG: CPT | Performed by: INTERNAL MEDICINE

## 2023-08-21 PROCEDURE — 1123F ACP DISCUSS/DSCN MKR DOCD: CPT | Performed by: INTERNAL MEDICINE

## 2023-08-21 ASSESSMENT — ENCOUNTER SYMPTOMS
RESPIRATORY NEGATIVE: 1
EYES NEGATIVE: 1
GASTROINTESTINAL NEGATIVE: 1

## 2023-08-21 NOTE — PROGRESS NOTES
10/25/2022    SEDRATE 16 10/12/2022    SEDRATE 21 09/15/2022    CRP 0.6 10/25/2022    CRP 6.6 10/12/2022    CRP 16.8 09/15/2022       No results found for: ANASCRN, SSA, SSB, ANTI-SMITH, DSDNAAB, ANTIRNP    No results found for: C3, C4    Lab Results   Component Value Date    RF 11 08/15/2022         RADIOLOGY / PROCEDURES:       ASSESSMENT/PLAN:     No diagnosis found. Seropositive rheumatoid arthritis - improved w/no synovitis - mild dz   - +RF, neg CCP. - prior tx: methotrexate 10 mg (leukopenia, LFT elevation)               - declines arava start, SSZ start               - plaquenil 200 mg TWICE DAILY-- need eye exam      Osteoarthritis bilateral hands- ibuprofen PRN   - mainly taking curcumin       Bilateral knee pain - suspected patellofemoral OSTEOARTHRITIS  - exercieses provided. Patient declined phy therpay     Medication monitoring               - CBC, CMP, sed rate, CRP q 6 months               - eye exam completed may 2023. No follow-ups on file. New Prescriptions    No medications on file       8/21/2023    Please contact the office if you have any questions or change of symptoms.

## 2023-08-22 DIAGNOSIS — D72.810 LYMPHOPENIA: Primary | ICD-10-CM

## 2023-08-22 NOTE — PROGRESS NOTES
Diagnosis Orders   1.  Lymphopenia  Protein Electrophoresis, Urine    Electrophoresis Protein, Serum with Reflex to Immunofixation

## 2023-08-23 NOTE — TELEPHONE ENCOUNTER
Jeovany December called requesting a refill on the following medications:  Requested Prescriptions     Pending Prescriptions Disp Refills    hydroxychloroquine (PLAQUENIL) 200 MG tablet 60 tablet 2     Sig: Take 1 tablet by mouth 2 times daily     Pharmacy verified:  [facility]       Date of last visit: 02-20-23  Date of next visit (if applicable): 8/64/2715 no

## 2023-08-29 ENCOUNTER — TELEPHONE (OUTPATIENT)
Dept: RHEUMATOLOGY | Age: 68
End: 2023-08-29

## 2023-08-29 NOTE — TELEPHONE ENCOUNTER
----- Message from Fred Castaneda DO sent at 8/22/2023  5:04 PM EDT -----  Lab testing revealing a low white blood cell count similar to prior evaluations. A few additional lab tests have been ordered to further evaluate this low blood count.

## 2023-09-14 ENCOUNTER — TELEPHONE (OUTPATIENT)
Dept: RHEUMATOLOGY | Age: 68
End: 2023-09-14

## 2023-09-14 NOTE — TELEPHONE ENCOUNTER
----- Message from Raffi Aguiar DO sent at 9/7/2023  3:43 PM EDT -----  The urine protein electrophoresis was negative for abnormal proteins

## 2023-12-26 DIAGNOSIS — M05.79 RHEUMATOID ARTHRITIS INVOLVING MULTIPLE SITES WITH POSITIVE RHEUMATOID FACTOR (HCC): ICD-10-CM

## 2023-12-26 RX ORDER — HYDROXYCHLOROQUINE SULFATE 200 MG/1
200 TABLET, FILM COATED ORAL 2 TIMES DAILY
Qty: 60 TABLET | Refills: 2 | Status: SHIPPED | OUTPATIENT
Start: 2023-12-26

## 2023-12-26 NOTE — TELEPHONE ENCOUNTER
Femi Washburn called requesting a refill on the following medications:  Requested Prescriptions     Pending Prescriptions Disp Refills    hydroxychloroquine (PLAQUENIL) 200 MG tablet 60 tablet 2     Sig: Take 1 tablet by mouth 2 times daily     Pharmacy verified:[facility] pharmacy   . pv      Date of last visit:   Date of next visit (if applicable): Visit date not found

## 2024-02-02 DIAGNOSIS — M05.79 RHEUMATOID ARTHRITIS INVOLVING MULTIPLE SITES WITH POSITIVE RHEUMATOID FACTOR (HCC): ICD-10-CM

## 2024-02-02 RX ORDER — HYDROXYCHLOROQUINE SULFATE 200 MG/1
200 TABLET, FILM COATED ORAL 2 TIMES DAILY
Qty: 180 TABLET | Refills: 3 | OUTPATIENT
Start: 2024-02-02

## 2024-03-08 DIAGNOSIS — M05.79 RHEUMATOID ARTHRITIS INVOLVING MULTIPLE SITES WITH POSITIVE RHEUMATOID FACTOR (HCC): ICD-10-CM

## 2024-03-08 RX ORDER — HYDROXYCHLOROQUINE SULFATE 200 MG/1
200 TABLET, FILM COATED ORAL 2 TIMES DAILY
Qty: 180 TABLET | Refills: 3 | Status: SHIPPED | OUTPATIENT
Start: 2024-03-08

## 2024-08-21 ENCOUNTER — OFFICE VISIT (OUTPATIENT)
Dept: RHEUMATOLOGY | Age: 69
End: 2024-08-21
Payer: COMMERCIAL

## 2024-08-21 VITALS
OXYGEN SATURATION: 98 % | HEART RATE: 70 BPM | WEIGHT: 192 LBS | BODY MASS INDEX: 25.45 KG/M2 | HEIGHT: 73 IN | SYSTOLIC BLOOD PRESSURE: 148 MMHG | DIASTOLIC BLOOD PRESSURE: 90 MMHG

## 2024-08-21 DIAGNOSIS — M19.041 LOCALIZED OSTEOARTHRITIS OF BOTH HANDS: ICD-10-CM

## 2024-08-21 DIAGNOSIS — D72.819 LEUKOPENIA, UNSPECIFIED TYPE: ICD-10-CM

## 2024-08-21 DIAGNOSIS — M05.79 RHEUMATOID ARTHRITIS INVOLVING MULTIPLE SITES WITH POSITIVE RHEUMATOID FACTOR (HCC): Primary | ICD-10-CM

## 2024-08-21 DIAGNOSIS — M19.042 LOCALIZED OSTEOARTHRITIS OF BOTH HANDS: ICD-10-CM

## 2024-08-21 DIAGNOSIS — M25.551 BILATERAL HIP PAIN: ICD-10-CM

## 2024-08-21 DIAGNOSIS — M25.552 BILATERAL HIP PAIN: ICD-10-CM

## 2024-08-21 DIAGNOSIS — Z51.81 MEDICATION MONITORING ENCOUNTER: ICD-10-CM

## 2024-08-21 PROCEDURE — 1123F ACP DISCUSS/DSCN MKR DOCD: CPT | Performed by: INTERNAL MEDICINE

## 2024-08-21 PROCEDURE — G2211 COMPLEX E/M VISIT ADD ON: HCPCS | Performed by: INTERNAL MEDICINE

## 2024-08-21 PROCEDURE — 3080F DIAST BP >= 90 MM HG: CPT | Performed by: INTERNAL MEDICINE

## 2024-08-21 PROCEDURE — 99214 OFFICE O/P EST MOD 30 MIN: CPT | Performed by: INTERNAL MEDICINE

## 2024-08-21 PROCEDURE — 3077F SYST BP >= 140 MM HG: CPT | Performed by: INTERNAL MEDICINE

## 2024-08-21 ASSESSMENT — JOINT PAIN
TOTAL NUMBER OF TENDER JOINTS: 0
TOTAL NUMBER OF SWOLLEN JOINTS: 0

## 2024-08-21 ASSESSMENT — ENCOUNTER SYMPTOMS
GASTROINTESTINAL NEGATIVE: 1
RESPIRATORY NEGATIVE: 1
EYES NEGATIVE: 1

## 2024-08-21 NOTE — PROGRESS NOTES
[facility] RHEUMATOLOGY FOLLOW UP NOTE       Date Of Service: 8/21/2024  Provider: KERRI DASILVA DO, DO  PCP: Davy Guidry MD   Name: Ayaan West   MRN: [id number]        History of Present Illness (HPI)     Chief Complaint   Patient presents with    Follow-up     1 year f/u RA  He is doing well. He would like to discuss stopping the plaquenil.   He still has pain in his hips and knees. Standing up and down can be painful. He has a hard time crossing his right leg. His pain is a 2-3.        Ayaan West  is a(n)68 y.o. male with a hx of  has a past medical history of Hypertension.  here for the f/u evaluation of Rheumatoid arthritis  , osteoarthritis  and de Quervain's tenosynovitis      Reported compliance with hydroxychloroquine .   Mild pain 2.5/10 in the bilateral hips and knees. Mild intermittent stiffness of the fingers.   Aggravating: hips/knees - squatting, getting up from seated positiong.   Allevaiating:  Limited ROm of hips causing difficulty crossing legs, and putting on sock (right > left) b/c hip  Denies joint swelling , morning stiffness,       REVIEW OF SYSTEMS: (ROS)    Review of Systems   Constitutional: Negative.    HENT: Negative.     Eyes: Negative.    Respiratory: Negative.     Cardiovascular: Negative.    Gastrointestinal: Negative.    Endocrine: Negative.    Genitourinary: Negative.    Skin: Negative.    Neurological: Negative.    Hematological: Negative.          PmHx:  has a past medical history of Hypertension.     Social History:  reports that he has quit smoking. His smoking use included cigarettes. He has never used smokeless tobacco.    No Known Allergies    CURRENT MEDICATIONS      Current Outpatient Medications:     hydroxychloroquine (PLAQUENIL) 200 MG tablet, TAKE 1 TABLET TWICE DAILY, Disp: 180 tablet, Rfl: 3    turmeric 500 MG CAPS, Take 750 mg by mouth daily, Disp: , Rfl:     EDARBYCLOR 40-12.5 MG TABS, Take 1 tablet by mouth daily Take 1/2 tab, Disp: , Rfl:

## 2024-08-22 LAB
ALBUMIN: 4.3 G/DL
ALP BLD-CCNC: 69 U/L
ALT SERPL-CCNC: 24 U/L
ANION GAP SERPL CALCULATED.3IONS-SCNC: 12 MMOL/L
AST SERPL-CCNC: 24 U/L
BASOPHILS ABSOLUTE: 0 /ΜL
BASOPHILS RELATIVE PERCENT: 1 %
BILIRUB SERPL-MCNC: 0.3 MG/DL (ref 0.1–1.4)
BUN BLDV-MCNC: 28 MG/DL
C-REACTIVE PROTEIN: 1.2
CALCIUM SERPL-MCNC: 9.1 MG/DL
CHLORIDE BLD-SCNC: 100 MMOL/L
CO2: 27 MMOL/L
CREAT SERPL-MCNC: 1.4 MG/DL
EOSINOPHILS ABSOLUTE: 0.2 /ΜL
EOSINOPHILS RELATIVE PERCENT: 5.2 %
GFR, ESTIMATED: 51
GLUCOSE BLD-MCNC: 107 MG/DL
HCT VFR BLD CALC: 47.2 % (ref 41–53)
HEMOGLOBIN: 15.3 G/DL (ref 13.5–17.5)
LYMPHOCYTES ABSOLUTE: 1.4 /ΜL
LYMPHOCYTES RELATIVE PERCENT: 33.3 %
MCH RBC QN AUTO: 32.3 PG
MCHC RBC AUTO-ENTMCNC: 32.4 G/DL
MCV RBC AUTO: 99.6 FL
MONOCYTES ABSOLUTE: 0.4 /ΜL
MONOCYTES RELATIVE PERCENT: 10.6 %
NEUTROPHILS ABSOLUTE: 2 /ΜL
NEUTROPHILS RELATIVE PERCENT: 49.7 %
PDW BLD-RTO: 46.1 %
PLATELET # BLD: 219 K/ΜL
PMV BLD AUTO: 9.2 FL
POTASSIUM SERPL-SCNC: 4.3 MMOL/L
RBC # BLD: 4.74 10^6/ΜL
SED RATE, AUTOMATED: 9
SODIUM BLD-SCNC: 139 MMOL/L
TOTAL PROTEIN: 7 G/DL (ref 6.4–8.2)
WBC # BLD: 4.1 10^3/ML

## 2024-08-27 ENCOUNTER — HOSPITAL ENCOUNTER (OUTPATIENT)
Dept: GENERAL RADIOLOGY | Age: 69
Discharge: HOME | End: 2024-08-27

## 2024-08-27 ENCOUNTER — TELEPHONE (OUTPATIENT)
Dept: RHEUMATOLOGY | Age: 69
End: 2024-08-27

## 2024-08-27 DIAGNOSIS — Z00.6 ENCOUNTER FOR EXAMINATION FOR NORMAL COMPARISON OR CONTROL IN CLINICAL RESEARCH PROGRAM: ICD-10-CM

## 2024-08-27 DIAGNOSIS — M05.79 RHEUMATOID ARTHRITIS INVOLVING MULTIPLE SITES WITH POSITIVE RHEUMATOID FACTOR (HCC): ICD-10-CM

## 2024-08-27 DIAGNOSIS — M25.551 BILATERAL HIP PAIN: ICD-10-CM

## 2024-08-27 DIAGNOSIS — M25.552 BILATERAL HIP PAIN: ICD-10-CM

## 2024-08-27 DIAGNOSIS — R93.6 ABNORMAL X-RAY OF FEMUR: Primary | ICD-10-CM

## 2024-08-27 NOTE — TELEPHONE ENCOUNTER
Abnormal x-ray - left hip - sclerotic change of the left femoral neck    - MRI ordered to evaluate for potential malignancy    - pt called & informed of the x-ray changes.   - reported understanding and had no additional questions.     osteoarthritis hips bilateral.    --- MRI can evaluate for active synovitis related to the inflammatory arthritis                 - The x-ray of the left hip did reveal degenerative arthritis/osteoarthritic changes.  There is a thickened area of bone (sclerotic area) within the thigh bone.  It was recommended an MRI or bone scan to evaluate for possible bony tumor/cancer    - The x-ray of the right hip revealed osteoarthritis type changes the

## 2024-08-29 ENCOUNTER — HOSPITAL ENCOUNTER (OUTPATIENT)
Dept: GENERAL RADIOLOGY | Age: 69
Discharge: HOME | End: 2024-08-29

## 2024-08-29 ENCOUNTER — TELEPHONE (OUTPATIENT)
Dept: RHEUMATOLOGY | Age: 69
End: 2024-08-29

## 2024-08-29 DIAGNOSIS — Z00.6 EXAMINATION FOR NORMAL COMPARISON OR CONTROL IN CLINICAL RESEARCH: ICD-10-CM

## 2024-08-29 DIAGNOSIS — R79.89 ELEVATED SERUM CREATININE: Primary | ICD-10-CM

## 2024-08-29 NOTE — TELEPHONE ENCOUNTER
----- Message from Dr. Chloe Rizzo DO sent at 8/27/2024  5:14 PM EDT -----  The x-ray of the right knee revealed no abnormalities.    Please ask the outside facility have the recent x-rays pushed over to our system for review

## 2024-08-29 NOTE — TELEPHONE ENCOUNTER
Received call from [facility] stating diagnosis code for urine culture is not covered under medicare. Provided code N39.0 for suspect UTI. Code covered per Marcie smith [facility].

## 2024-08-29 NOTE — TELEPHONE ENCOUNTER
----- Message from Dr. Chloe Rizzo DO sent at 8/26/2024  1:04 PM EDT -----  The recent labs show worsening of the kidney function test compared to prior evaluations.      Have you recently started any new medications, have you been using any over-the-counter medications or supplements such as ibuprofen, Aleve, naproxen, Motrin, turmeric?

## 2024-08-29 NOTE — TELEPHONE ENCOUNTER
1. Elevated serum creatinine  -     Sodium, urine, random; Future  -     Protein / creatinine ratio, urine; Future  -     UA W/Microscopic, Rfx to Culture; Future

## 2024-08-30 LAB
BILIRUBIN, URINE: NEGATIVE
BLOOD, URINE: NEGATIVE
CLARITY, UA: CLEAR
COLOR, UA: YELLOW
GLUCOSE URINE: NEGATIVE
KETONES, URINE: NEGATIVE
LEUKOCYTE ESTERASE, URINE: NEGATIVE
NITRITE, URINE: NEGATIVE
PH UA: 6 (ref 4.5–8)
PROTEIN UA: NEGATIVE
SPECIFIC GRAVITY UA: 1.01 (ref 1–1.03)
UROBILINOGEN, URINE: NORMAL

## 2024-09-05 ENCOUNTER — TELEPHONE (OUTPATIENT)
Dept: RHEUMATOLOGY | Age: 69
End: 2024-09-05

## 2024-09-05 NOTE — TELEPHONE ENCOUNTER
----- Message from Dr. Chloe Rizzo DO sent at 9/4/2024  8:40 AM EDT -----  The urine studies revealed no evidence of infection or protein in the urine.  Based upon calculation of your fractional excretion of sodium it appears that you may have some dehydration.  I would recommend that you increase your water intake and follow-up with your primary care provider for continued monitoring of kidney function test.

## 2024-09-19 DIAGNOSIS — R93.6 ABNORMAL X-RAY OF FEMUR: ICD-10-CM

## 2024-09-20 ENCOUNTER — TELEPHONE (OUTPATIENT)
Dept: RHEUMATOLOGY | Age: 69
End: 2024-09-20

## 2025-08-21 ENCOUNTER — LAB (OUTPATIENT)
Dept: LAB | Age: 70
End: 2025-08-21

## 2025-08-21 ENCOUNTER — OFFICE VISIT (OUTPATIENT)
Age: 70
End: 2025-08-21
Payer: MEDICARE

## 2025-08-21 VITALS
WEIGHT: 201 LBS | BODY MASS INDEX: 26.64 KG/M2 | OXYGEN SATURATION: 98 % | HEIGHT: 73 IN | DIASTOLIC BLOOD PRESSURE: 96 MMHG | HEART RATE: 84 BPM | SYSTOLIC BLOOD PRESSURE: 132 MMHG

## 2025-08-21 DIAGNOSIS — M16.0 OSTEOARTHRITIS OF BOTH HIPS, UNSPECIFIED OSTEOARTHRITIS TYPE: ICD-10-CM

## 2025-08-21 DIAGNOSIS — M05.79 RHEUMATOID ARTHRITIS INVOLVING MULTIPLE SITES WITH POSITIVE RHEUMATOID FACTOR (HCC): ICD-10-CM

## 2025-08-21 DIAGNOSIS — M05.79 RHEUMATOID ARTHRITIS INVOLVING MULTIPLE SITES WITH POSITIVE RHEUMATOID FACTOR (HCC): Primary | ICD-10-CM

## 2025-08-21 DIAGNOSIS — Z51.81 MEDICATION MONITORING ENCOUNTER: ICD-10-CM

## 2025-08-21 DIAGNOSIS — M19.042 LOCALIZED OSTEOARTHRITIS OF BOTH HANDS: ICD-10-CM

## 2025-08-21 DIAGNOSIS — M19.041 LOCALIZED OSTEOARTHRITIS OF BOTH HANDS: ICD-10-CM

## 2025-08-21 LAB
ALBUMIN SERPL BCG-MCNC: 4.1 G/DL (ref 3.4–4.9)
ALP SERPL-CCNC: 84 U/L (ref 40–129)
ALT SERPL W/O P-5'-P-CCNC: 34 U/L (ref 10–50)
ANION GAP SERPL CALC-SCNC: 10 MEQ/L (ref 8–16)
AST SERPL-CCNC: 26 U/L (ref 10–50)
BASOPHILS ABSOLUTE: 0 THOU/MM3 (ref 0–0.1)
BASOPHILS NFR BLD AUTO: 0.9 %
BILIRUB SERPL-MCNC: 0.3 MG/DL (ref 0.3–1.2)
BUN SERPL-MCNC: 20 MG/DL (ref 8–23)
CALCIUM SERPL-MCNC: 9.3 MG/DL (ref 8.5–10.5)
CHLORIDE SERPL-SCNC: 101 MEQ/L (ref 98–111)
CO2 SERPL-SCNC: 27 MEQ/L (ref 22–29)
CREAT SERPL-MCNC: 1.3 MG/DL (ref 0.7–1.2)
CRP SERPL-MCNC: 0.43 MG/DL (ref 0–0.5)
DEPRECATED RDW RBC AUTO: 48.4 FL (ref 35–45)
EOSINOPHIL NFR BLD AUTO: 6.4 %
EOSINOPHILS ABSOLUTE: 0.3 THOU/MM3 (ref 0–0.4)
ERYTHROCYTE [DISTWIDTH] IN BLOOD BY AUTOMATED COUNT: 13.2 % (ref 11.5–14.5)
GFR SERPL CREATININE-BSD FRML MDRD: 59 ML/MIN/1.73M2
GLUCOSE SERPL-MCNC: 106 MG/DL (ref 74–109)
HCT VFR BLD AUTO: 46.4 % (ref 42–52)
HGB BLD-MCNC: 15 GM/DL (ref 14–18)
IMM GRANULOCYTES # BLD AUTO: 0.02 THOU/MM3 (ref 0–0.07)
IMM GRANULOCYTES NFR BLD AUTO: 0.5 %
LYMPHOCYTES ABSOLUTE: 1.2 THOU/MM3 (ref 1–4.8)
LYMPHOCYTES NFR BLD AUTO: 26.5 %
MCH RBC QN AUTO: 31.6 PG (ref 26–33)
MCHC RBC AUTO-ENTMCNC: 32.3 GM/DL (ref 32.2–35.5)
MCV RBC AUTO: 97.9 FL (ref 80–94)
MONOCYTES ABSOLUTE: 0.4 THOU/MM3 (ref 0.4–1.3)
MONOCYTES NFR BLD AUTO: 8.9 %
NEUTROPHILS ABSOLUTE: 2.5 THOU/MM3 (ref 1.8–7.7)
NEUTROPHILS NFR BLD AUTO: 56.8 %
NRBC BLD AUTO-RTO: 0 /100 WBC
PLATELET # BLD AUTO: 245 THOU/MM3 (ref 130–400)
PMV BLD AUTO: 8.9 FL (ref 9.4–12.4)
POTASSIUM SERPL-SCNC: 4.3 MEQ/L (ref 3.5–5.2)
PROT SERPL-MCNC: 7.8 G/DL (ref 6.4–8.3)
RBC # BLD AUTO: 4.74 MILL/MM3 (ref 4.7–6.1)
SODIUM SERPL-SCNC: 138 MEQ/L (ref 135–145)
WBC # BLD AUTO: 4.4 THOU/MM3 (ref 4.8–10.8)

## 2025-08-21 PROCEDURE — G8419 CALC BMI OUT NRM PARAM NOF/U: HCPCS | Performed by: INTERNAL MEDICINE

## 2025-08-21 PROCEDURE — G2211 COMPLEX E/M VISIT ADD ON: HCPCS | Performed by: INTERNAL MEDICINE

## 2025-08-21 PROCEDURE — 3080F DIAST BP >= 90 MM HG: CPT | Performed by: INTERNAL MEDICINE

## 2025-08-21 PROCEDURE — G8427 DOCREV CUR MEDS BY ELIG CLIN: HCPCS | Performed by: INTERNAL MEDICINE

## 2025-08-21 PROCEDURE — 1160F RVW MEDS BY RX/DR IN RCRD: CPT | Performed by: INTERNAL MEDICINE

## 2025-08-21 PROCEDURE — 1125F AMNT PAIN NOTED PAIN PRSNT: CPT | Performed by: INTERNAL MEDICINE

## 2025-08-21 PROCEDURE — 99213 OFFICE O/P EST LOW 20 MIN: CPT | Performed by: INTERNAL MEDICINE

## 2025-08-21 PROCEDURE — 1159F MED LIST DOCD IN RCRD: CPT | Performed by: INTERNAL MEDICINE

## 2025-08-21 PROCEDURE — 1123F ACP DISCUSS/DSCN MKR DOCD: CPT | Performed by: INTERNAL MEDICINE

## 2025-08-21 PROCEDURE — 3075F SYST BP GE 130 - 139MM HG: CPT | Performed by: INTERNAL MEDICINE

## 2025-08-21 PROCEDURE — 1036F TOBACCO NON-USER: CPT | Performed by: INTERNAL MEDICINE

## 2025-08-21 PROCEDURE — 3017F COLORECTAL CA SCREEN DOC REV: CPT | Performed by: INTERNAL MEDICINE

## 2025-08-21 RX ORDER — CETIRIZINE HYDROCHLORIDE 10 MG/1
10 TABLET ORAL DAILY
COMMUNITY

## 2025-08-21 RX ORDER — PANTOPRAZOLE SODIUM 40 MG/1
40 TABLET, DELAYED RELEASE ORAL DAILY
COMMUNITY

## 2025-08-21 RX ORDER — IPRATROPIUM BROMIDE AND ALBUTEROL SULFATE 2.5; .5 MG/3ML; MG/3ML
3 SOLUTION RESPIRATORY (INHALATION) 3 TIMES DAILY PRN
COMMUNITY
Start: 2024-11-15

## 2025-08-21 RX ORDER — FLUTICASONE PROPIONATE AND SALMETEROL 250; 50 UG/1; UG/1
1 POWDER RESPIRATORY (INHALATION) 2 TIMES DAILY
COMMUNITY
Start: 2024-12-20 | End: 2025-12-20

## 2025-08-21 RX ORDER — MONTELUKAST SODIUM 10 MG/1
10 TABLET ORAL DAILY
COMMUNITY
Start: 2024-12-13

## 2025-08-21 RX ORDER — MAGNESIUM OXIDE 400 MG/1
400 TABLET ORAL DAILY
COMMUNITY

## 2025-08-21 RX ORDER — ALBUTEROL SULFATE 90 UG/1
INHALANT RESPIRATORY (INHALATION)
COMMUNITY

## 2025-08-21 RX ORDER — OMEGA-3 FATTY ACIDS/FISH OIL 300-1000MG
2 CAPSULE ORAL
COMMUNITY

## 2025-08-21 ASSESSMENT — ENCOUNTER SYMPTOMS
EYES NEGATIVE: 1
GASTROINTESTINAL NEGATIVE: 1
RESPIRATORY NEGATIVE: 1